# Patient Record
Sex: MALE | HISPANIC OR LATINO | Employment: FULL TIME | URBAN - METROPOLITAN AREA
[De-identification: names, ages, dates, MRNs, and addresses within clinical notes are randomized per-mention and may not be internally consistent; named-entity substitution may affect disease eponyms.]

---

## 2017-08-18 ENCOUNTER — OFFICE VISIT (OUTPATIENT)
Dept: URGENT CARE | Facility: CLINIC | Age: 32
End: 2017-08-18

## 2017-08-18 DIAGNOSIS — Z02.1 PHYSICAL EXAM, PRE-EMPLOYMENT: ICD-10-CM

## 2017-08-18 DIAGNOSIS — Z02.1 PRE-EMPLOYMENT DRUG TESTING: ICD-10-CM

## 2017-08-18 PROCEDURE — 8915 PR COMPREHENSIVE PHYSICAL: Performed by: NURSE PRACTITIONER

## 2017-08-18 PROCEDURE — 8907 PR URINE COLLECT ONLY: Performed by: NURSE PRACTITIONER

## 2017-08-18 ASSESSMENT — ENCOUNTER SYMPTOMS
HEMOPTYSIS: 0
WEAKNESS: 0
MYALGIAS: 0
COUGH: 0
FEVER: 0
SPUTUM PRODUCTION: 0
BACK PAIN: 0
NECK PAIN: 0
DIAPHORESIS: 0
ORTHOPNEA: 0
FALLS: 0
PALPITATIONS: 0
CHILLS: 0
HEADACHES: 0
SHORTNESS OF BREATH: 0
WHEEZING: 0

## 2017-08-18 NOTE — MR AVS SNAPSHOT
Jorden Burleson   2017 1:30 PM   Appointment   MRN: [id number]    Department:  [facility]   Dept Phone:  [phone]    Description:  Male : 1985   Provider:  [facility]           Allergies as of 2017     Not on File      Basic Information     Date Of Birth Sex Preferred Language          1985 Male English        Health Maintenance     Patient has no pending health maintenance at this time      Current Immunizations     No immunizations on file.      Below and/or attached are the medications your provider expects you to take. Review all of your home medications and newly ordered medications with your provider and/or pharmacist. Follow medication instructions as directed by your provider and/or pharmacist. Please keep your medication list with you and share with your provider. Update the information when medications are discontinued, doses are changed, or new medications (including over-the-counter products) are added; and carry medication information at all times in the event of emergency situations     Allergies:  (Not on file)          Medications  Valid as of: 2017 -  1:59 PM    Generic Name Brand Name Tablet Size Instructions for use    .                 Medicines prescribed today were sent to:     None      Medication refill instructions:       If your prescription bottle indicates you have medication refills left, it is not necessary to call your provider’s office. Please contact your pharmacy and they will refill your medication.    If your prescription bottle indicates you do not have any refills left, you may request refills at any time through one of the following ways: The online Enevate system (except Urgent Care), by calling your provider’s office, or by asking your pharmacy to contact your provider’s office with a refill request. Medication refills are processed only during regular business hours and may not be available until the next business day.  Your provider may request additional information or to have a follow-up visit with you prior to refilling your medication.   *Please Note: Medication refills are assigned a new Rx number when refilled electronically. Your pharmacy may indicate that no refills were authorized even though a new prescription for the same medication is available at the pharmacy. Please request the medicine by name with the pharmacy before contacting your provider for a refill.           Jukedocs Access Code: [email or web address]  Expires: 9/17/2017  1:59 PM    Your email address is not on file at [facility].  Email Addresses are required for you to sign up for Jukedocs, please contact [phone] to verify your personal information and to provide your email address prior to attempting to register for Jukedocs.    [id number]  [place] [address]    Jukedocs  A secure, online tool to manage your health information     [employer] [facility]’s Jukedocs® is a secure, online tool that connects you to your personalized health information from the privacy of your home -- day or night - making it very easy for you to manage your healthcare. Once the activation process is completed, you can even access your medical information using the Jukedocs lauren, which is available for free in the Apple Lauren store or Google Play store.     To learn more about Jukedocs, visit [email or web address]    There are two levels of access available (as shown below):   My Chart Features  [facility] Primary Care Doctor [facility]  Specialists [facility]  Urgent  Care Non-[facility] Primary Care Doctor   Email your healthcare team securely and privately 24/7 X X X    Manage appointments: schedule your next appointment; view details of past/upcoming appointments X      Request prescription refills. X      View recent personal medical records, including lab and immunizations X X X X   View health record, including health history, allergies, medications X X X X   Read reports  about your outpatient visits, procedures, consult and ER notes X X X X   See your discharge summary, which is a recap of your hospital and/or ER visit that includes your diagnosis, lab results, and care plan X X  X     How to register for Hubblr:  Once your e-mail address has been verified, follow the following steps to sign up for Hubblr.     1. Go to  [email or web address]  2. Click on the Sign Up Now box, which takes you to the New Member Sign Up page. You will need to provide the following information:  a. Enter your Hubblr Access Code exactly as it appears at the top of this page. (You will not need to use this code after you’ve completed the sign-up process. If you do not sign up before the expiration date, you must request a new code.)   b. Enter your date of birth.   c. Enter your home email address.   d. Click Submit, and follow the next screen’s instructions.  3. Create a Hubblr ID. This will be your Hubblr login ID and cannot be changed, so think of one that is secure and easy to remember.  4. Create a Amanda Huff [employer] password. You can change your password at any time.  5. Enter your Password Reset Question and Answer. This can be used at a later time if you forget your password.   6. Enter your e-mail address. This allows you to receive e-mail notifications when new information is available in Hubblr.  7. Click Sign Up. You can now view your health information.    For assistance activating your Hubblr account, call [phone]

## 2017-08-18 NOTE — PROGRESS NOTES
Subjective:      Jorden Burleson is a 32 y.o. male who presents with No chief complaint on file.            HPI  Patient comes in today for a pre-employment physical.  He reports he is in general good health.  He denies any chronic medical conditions.  He does not take any medications, supplements or herbs regularly.  NO history of significant illness, surgery, or broken bones.      Review of Systems   Constitutional: Negative for fever, chills, malaise/fatigue and diaphoresis.   Respiratory: Negative for cough, hemoptysis, sputum production, shortness of breath and wheezing.    Cardiovascular: Negative for chest pain, palpitations, orthopnea and leg swelling.   Musculoskeletal: Negative for myalgias, back pain, joint pain, falls and neck pain.   Neurological: Negative for weakness and headaches.     Medications, Allergies, and current problem list reviewed today in Epic     Objective:     There were no vitals taken for this visit.     Physical Exam   Constitutional: He is oriented to person, place, and time. He appears well-developed and well-nourished. No distress.   HENT:   Head: Normocephalic.   Right Ear: External ear normal.   Left Ear: External ear normal.   Nose: Nose normal.   Mouth/Throat: Oropharynx is clear and moist. No oropharyngeal exudate.   Eyes: Conjunctivae and EOM are normal. Pupils are equal, round, and reactive to light. Right eye exhibits no discharge. Left eye exhibits no discharge. No scleral icterus.   Neck: Normal range of motion. Neck supple. No JVD present. No tracheal deviation present. No thyromegaly present.   Cardiovascular: Normal rate, regular rhythm and normal heart sounds.  Exam reveals no gallop and no friction rub.    No murmur heard.  Pulmonary/Chest: Effort normal and breath sounds normal. No stridor. No respiratory distress. He has no wheezes. He has no rales. He exhibits no tenderness.   Abdominal: Soft. Bowel sounds are normal. He exhibits no distension and no mass. There is  no tenderness. There is no rebound and no guarding. No hernia. Hernia confirmed negative in the right inguinal area and confirmed negative in the left inguinal area.   Genitourinary: Testes normal.   No inguinal hernia.   Musculoskeletal: Normal range of motion. He exhibits no edema or tenderness.   Lymphadenopathy:     He has no cervical adenopathy.   Neurological: He is alert and oriented to person, place, and time. No cranial nerve deficit. Coordination normal.   Skin: Skin is warm and dry. No rash noted. He is not diaphoretic. No erythema.   Psychiatric: He has a normal mood and affect. His behavior is normal. Judgment and thought content normal.   Vitals reviewed.    -see scanned form.          Assessment/Plan:     1. Physical exam, pre-employment    Cleared to work with no restrictions.  Establish with a PCP for routine health care, screening, and maintenance.    Patient verbalized understanding of and agreed with plan of care.